# Patient Record
Sex: FEMALE | Race: WHITE | Employment: OTHER | URBAN - NONMETROPOLITAN AREA
[De-identification: names, ages, dates, MRNs, and addresses within clinical notes are randomized per-mention and may not be internally consistent; named-entity substitution may affect disease eponyms.]

---

## 2019-07-21 NOTE — PROGRESS NOTES
[facility]                                                    Pulmonary medicine clinic initial consult note. Patient: Kinza Lozano  : 1960     Lung Nodule Screening     [] Qualifies    [x] Does not qualify   [] Declined    [] Completed        Chief complaint/Lime: Kinza Lozano is a 60 y. o.oldfemale came for further evaluation regarding her bronchial asthma and COPD and shortness of breath with referral from MD Ghassan.     She was diagnosed with bronchial asthma 15years back and COPD 5years back. She used to follow with MD Cesar- pulmonologist at [facility] in the past.    She was started on Lisinopril for her hypertension only 1month back. She is suffering with chronic cough for the last several years. She is currently on treatment with following inhalers/Nebs:  -Combivent 20mcg/100mcg Spray MDI 1 puff inhalerd QID ( Maximum is 6 puffs/24h)  -She is not using any nebulizer. - She used to be on Flovent HFA 110mcg/INH, 2INH BID in the past from Dr. Chaz catalan. She don't know why it was stopped. She is having shortness of breath: Yes  Onset: gradual   Duration:10years  Diurnal variation: Worse in the morning and in the evening  Functional status prior to beginning of symptoms: 3 to 4miles on level ground. Current functional capacity on level ground: 0.5 block/s on level ground. She can climb steps: Yes- slowly  Flights of steps she can climb: 1- 14steps. She admits to orthopnea. She admits to paroxysmal nocturnal dyspnea. She is having cough: Yes  Duration of cough: 10years  Her cough is associated with sputum production: No   Hemoptysis:No  Diurnal variation: None.     Relieving factors: No  Aggravating factors: No    She is having chest pain:No  She gives a history of fever: No  Chills & rigors:No  Associated night sweats: No.  Recent travel history:No.

## 2019-08-01 ENCOUNTER — OFFICE VISIT (OUTPATIENT)
Dept: PULMONOLOGY | Age: 59
End: 2019-08-01
Payer: MEDICARE

## 2019-08-01 VITALS
HEART RATE: 66 BPM | BODY MASS INDEX: 23.5 KG/M2 | SYSTOLIC BLOOD PRESSURE: 120 MMHG | OXYGEN SATURATION: 97 % | DIASTOLIC BLOOD PRESSURE: 70 MMHG | WEIGHT: 132.6 LBS | HEIGHT: 63 IN | TEMPERATURE: 96.7 F

## 2019-08-01 DIAGNOSIS — J45.30 MILD PERSISTENT ASTHMA WITHOUT COMPLICATION: Primary | ICD-10-CM

## 2019-08-01 DIAGNOSIS — R06.00 DYSPNEA, UNSPECIFIED TYPE: ICD-10-CM

## 2019-08-01 DIAGNOSIS — J30.9 ALLERGIC RHINITIS, UNSPECIFIED SEASONALITY, UNSPECIFIED TRIGGER: ICD-10-CM

## 2019-08-01 PROCEDURE — 99204 OFFICE O/P NEW MOD 45 MIN: CPT | Performed by: INTERNAL MEDICINE

## 2019-08-01 RX ORDER — FLUTICASONE PROPIONATE 50 MCG
2 SPRAY, SUSPENSION (ML) NASAL DAILY
Qty: 1 BOTTLE | Refills: 11 | Status: SHIPPED | OUTPATIENT
Start: 2019-08-01 | End: 2020-09-14

## 2019-08-01 RX ORDER — FLUTICASONE PROPIONATE 110 UG/1
2 AEROSOL, METERED RESPIRATORY (INHALATION) 2 TIMES DAILY
Qty: 1 INHALER | Refills: 11 | Status: SHIPPED | OUTPATIENT
Start: 2019-08-01 | End: 2020-09-14

## 2019-08-01 RX ORDER — LISINOPRIL 10 MG/1
10 TABLET ORAL DAILY
COMMUNITY

## 2019-08-01 RX ORDER — ALBUTEROL SULFATE 90 UG/1
2 AEROSOL, METERED RESPIRATORY (INHALATION) EVERY 6 HOURS PRN
Qty: 1 INHALER | Refills: 11 | Status: SHIPPED | OUTPATIENT
Start: 2019-08-01 | End: 2020-09-14

## 2019-08-01 RX ORDER — GABAPENTIN 300 MG/1
300 CAPSULE ORAL 3 TIMES DAILY
COMMUNITY

## 2019-08-01 RX ORDER — HYDROCODONE BITARTRATE AND ACETAMINOPHEN 5; 325 MG/1; MG/1
1 TABLET ORAL EVERY 8 HOURS PRN
COMMUNITY
End: 2020-09-14

## 2019-08-01 SDOH — HEALTH STABILITY: MENTAL HEALTH: HOW OFTEN DO YOU HAVE A DRINK CONTAINING ALCOHOL?: MONTHLY OR LESS

## 2019-08-01 SDOH — HEALTH STABILITY: MENTAL HEALTH: HOW MANY STANDARD DRINKS CONTAINING ALCOHOL DO YOU HAVE ON A TYPICAL DAY?: 1 OR 2
